# Patient Record
Sex: FEMALE | URBAN - METROPOLITAN AREA
[De-identification: names, ages, dates, MRNs, and addresses within clinical notes are randomized per-mention and may not be internally consistent; named-entity substitution may affect disease eponyms.]

---

## 2018-08-30 ENCOUNTER — OFFICE VISIT (OUTPATIENT)
Dept: URBAN - METROPOLITAN AREA CLINIC 88 | Facility: CLINIC | Age: 48
End: 2018-08-30
Payer: COMMERCIAL

## 2018-08-30 PROCEDURE — 99213 OFFICE O/P EST LOW 20 MIN: CPT | Performed by: OPHTHALMOLOGY

## 2018-08-30 ASSESSMENT — KERATOMETRY
OD: 44.75
OS: 44.75

## 2018-08-30 NOTE — IMPRESSION/PLAN
Impression: Vitreous hemorrhage, right eye: H43.11. Plan: Discussed diagnosis in detail with patient. The patient was advised to limit physical activity and limit lifting to 50lbs. Patient was instructed to report any type of visual field changes or visual changes immediately. Patient was told to sleep with 2 pillows in order to allow the blood to gravitate.

## 2018-08-30 NOTE — IMPRESSION/PLAN
Impression: Type 2 diabetes mellitus with proliferative diabetic retinopathy without macular edema, bilateral: Z32.0341. Plan: Discussed diagnosis in detail with patient. Emphasized blood sugar control. Keep future appts with PCP. If the [facility] does not clear within 3 weeks, patient was advised to call office will see patient sooner. Discussed Anti-VEGF injection if the [facility] does not clear. Risks/benefits were discussed.

## 2018-10-04 ENCOUNTER — OFFICE VISIT (OUTPATIENT)
Dept: URBAN - METROPOLITAN AREA CLINIC 88 | Facility: CLINIC | Age: 48
End: 2018-10-04
Payer: COMMERCIAL

## 2018-10-04 DIAGNOSIS — H25.813 COMBINED FORMS OF AGE-RELATED CATARACT, BILATERAL: ICD-10-CM

## 2018-10-04 PROCEDURE — 99213 OFFICE O/P EST LOW 20 MIN: CPT | Performed by: OPHTHALMOLOGY

## 2018-10-04 ASSESSMENT — INTRAOCULAR PRESSURE
OS: 16
OD: 16

## 2018-10-04 NOTE — IMPRESSION/PLAN
Impression: Combined forms of age-related cataract, bilateral: H25.813. Plan: Discussed diagnosis in detail with patient. Surgical treatment is required. Surgical risks and benefits were discussed, explained and understood by patient. Explained to the patient if she undergoes Cataract sx OS, will not improve her Central vision may improve her periphery vision.

## 2018-10-04 NOTE — IMPRESSION/PLAN
Impression: Type 2 diabetes mellitus with proliferative diabetic retinopathy without macular edema, bilateral: Q21.2712. Plan: Discussed diagnosis in detail with patient. Emphasized blood sugar control. Keep future appts with PCP. Recommend Avastin injection treatment ,risks and benefits were discussed, explained and understood by patient.

## 2018-10-09 ENCOUNTER — PROCEDURE (OUTPATIENT)
Dept: URBAN - METROPOLITAN AREA CLINIC 88 | Facility: CLINIC | Age: 48
End: 2018-10-09
Payer: COMMERCIAL

## 2018-10-09 PROCEDURE — 67028 INJECTION EYE DRUG: CPT | Performed by: OPHTHALMOLOGY

## 2018-10-09 ASSESSMENT — INTRAOCULAR PRESSURE
OD: 15
OD: 16
OS: 18

## 2018-11-06 ENCOUNTER — OFFICE VISIT (OUTPATIENT)
Dept: URBAN - METROPOLITAN AREA CLINIC 88 | Facility: CLINIC | Age: 48
End: 2018-11-06
Payer: COMMERCIAL

## 2018-11-06 DIAGNOSIS — E11.3591 TYPE 2 DIAB WITH PROLIF DIAB RTNOP WITHOUT MCLR EDEMA, R EYE: Primary | ICD-10-CM

## 2018-11-06 PROCEDURE — 67028 INJECTION EYE DRUG: CPT | Performed by: OPHTHALMOLOGY

## 2018-11-06 ASSESSMENT — INTRAOCULAR PRESSURE
OD: 30
OD: 15
OS: 16

## 2018-12-13 ENCOUNTER — OFFICE VISIT (OUTPATIENT)
Dept: URBAN - METROPOLITAN AREA CLINIC 88 | Facility: CLINIC | Age: 48
End: 2018-12-13
Payer: COMMERCIAL

## 2018-12-13 DIAGNOSIS — E11.3593 TYPE 2 DIAB WITH PROLIF DIAB RTNOP WITHOUT MACULAR EDEMA, BI: Primary | ICD-10-CM

## 2018-12-13 PROCEDURE — 99214 OFFICE O/P EST MOD 30 MIN: CPT | Performed by: OPHTHALMOLOGY

## 2018-12-13 ASSESSMENT — INTRAOCULAR PRESSURE
OS: 19
OD: 19

## 2018-12-13 NOTE — IMPRESSION/PLAN
Impression: Combined forms of age-related cataract, bilateral: H25.813. OU. Plan: Cataracts account for the patient's complaints. Discussed all risks, benefits, procedures and recovery. Patient understands changing glasses will not improve vision. Patient desires to have surgery, recommend phacoemulsification with intraocular lens. Explained to the patient by removing the Cataract on the left eye she may noticed improvement with her peripheral vision. Surgical risks and benefits were discussed, explained and to patient.  Final post operative refractive decision will be made by Operative Surgeon

## 2018-12-13 NOTE — IMPRESSION/PLAN
Impression: Type 2 diab with prolif diab rtnop without macular edema, bi: V94.9490 OU. Condition: established, worsening. Plan: Discussed diagnosis in detail with patient. Discussed ocular and systemic benefits of blood sugar control. Keep future appts. with PCP. Discussed with patient she may need Avastin tx. in the future. Patient was instructed to monitor vision for sudden changes and to call if visual changes noted.

## 2018-12-13 NOTE — IMPRESSION/PLAN
Impression: Vitreous hemorrhage, bilateral: H43.13. OU. Condition: will continue to monitor. Plan: Discussed diagnosis in detail with patient. Patient was instructed to report any type of visual field changes or visual changes immediately.

## 2019-01-03 ENCOUNTER — OFFICE VISIT (OUTPATIENT)
Dept: URBAN - METROPOLITAN AREA CLINIC 88 | Facility: CLINIC | Age: 49
End: 2019-01-03
Payer: COMMERCIAL

## 2019-01-03 DIAGNOSIS — H35.82 RETINAL ISCHEMIA: ICD-10-CM

## 2019-01-03 DIAGNOSIS — H43.13 VITREOUS HEMORRHAGE, BILATERAL: ICD-10-CM

## 2019-01-03 PROCEDURE — 99214 OFFICE O/P EST MOD 30 MIN: CPT | Performed by: OPHTHALMOLOGY

## 2019-01-03 ASSESSMENT — KERATOMETRY
OD: 44.75
OS: 44.13

## 2019-01-03 ASSESSMENT — INTRAOCULAR PRESSURE
OD: 17
OS: 16

## 2019-01-03 ASSESSMENT — VISUAL ACUITY: OD: 20/30

## 2019-01-03 NOTE — IMPRESSION/PLAN
Impression: Vitreous hemorrhage, bilateral: H43.13. OU. Condition: resolving. Symptoms: will continue to monitor. Plan: Discussed diagnosis in detail with patient. The patient was advised to limit physical activity and limit lifting to 50lbs. Patient was instructed to report any type of visual field changes or visual changes immediately. Patient was told to sleep with 2 pillows in order to allow the blood to gravitate.

## 2019-01-03 NOTE — IMPRESSION/PLAN
Impression: Combined forms of age-related cataract, bilateral: H25.813. OU. Condition: established, worsening. Vision: vision affected. Plan: Cataracts account for the patient's complaints. Discussed all risks, benefits, procedures and recovery. Patient understands changing glasses will not improve vision. Patient desires to have surgery, recommend phacoemulsification with intraocular lens. Surgical risks and benefits were discussed, explained and to patient. Explained to the patient by removing the Cataract on the left eye will help with her peripheral vision only. Patient understands this and would like to proceed with recommendation.  Final post operative refractive decision will be made by Operative Surgeon

## 2019-01-03 NOTE — IMPRESSION/PLAN
Impression: Type 2 diab with prolif diab rtnop without macular edema, bi: Y83.8319. Condition: resolved. Symptoms: will continue to monitor. Plan: Discussed diagnosis in detail with patient. Emphasized blood sugar control. Keep future appts with PCP. I would recommend Avastin injection treatment OS before proceeding with Cataract surgery OS ,risks and benefits were discussed, explained and understood by patient. May need Avastin tx in the right eye in the future.

## 2019-01-03 NOTE — IMPRESSION/PLAN
Impression: Retinal ischemia: H35.82. OS. Condition: established, stable. Plan: Advised patient of condition. Continue to observe.

## 2019-01-17 ENCOUNTER — PROCEDURE (OUTPATIENT)
Dept: URBAN - METROPOLITAN AREA CLINIC 88 | Facility: CLINIC | Age: 49
End: 2019-01-17
Payer: COMMERCIAL

## 2019-01-17 DIAGNOSIS — E11.3592 TYPE 2 DIAB WITH PROLIF DIAB RTNOP WITHOUT MCLR EDEMA, L EYE: Primary | ICD-10-CM

## 2019-01-17 PROCEDURE — 67028 INJECTION EYE DRUG: CPT | Performed by: OPHTHALMOLOGY

## 2019-01-17 ASSESSMENT — INTRAOCULAR PRESSURE
OS: 18
OD: 18

## 2019-01-25 ENCOUNTER — TESTING ONLY (OUTPATIENT)
Dept: URBAN - METROPOLITAN AREA CLINIC 88 | Facility: CLINIC | Age: 49
End: 2019-01-25
Payer: MEDICARE

## 2019-01-25 PROCEDURE — 92136 OPHTHALMIC BIOMETRY: CPT | Performed by: OPHTHALMOLOGY

## 2019-01-31 ENCOUNTER — Encounter (OUTPATIENT)
Dept: URBAN - METROPOLITAN AREA EXTERNAL CLINIC 48 | Facility: EXTERNAL CLINIC | Age: 49
End: 2019-01-31
Payer: MEDICARE

## 2019-01-31 PROCEDURE — 66984 XCAPSL CTRC RMVL W/O ECP: CPT | Performed by: OPHTHALMOLOGY

## 2019-02-01 ENCOUNTER — POST-OPERATIVE VISIT (OUTPATIENT)
Dept: URBAN - METROPOLITAN AREA CLINIC 88 | Facility: CLINIC | Age: 49
End: 2019-02-01

## 2019-02-01 PROCEDURE — 99024 POSTOP FOLLOW-UP VISIT: CPT | Performed by: OPTOMETRIST

## 2019-02-01 ASSESSMENT — INTRAOCULAR PRESSURE: OS: 17

## 2019-02-08 ENCOUNTER — POST-OPERATIVE VISIT (OUTPATIENT)
Dept: URBAN - METROPOLITAN AREA CLINIC 88 | Facility: CLINIC | Age: 49
End: 2019-02-08

## 2019-02-08 PROCEDURE — 99024 POSTOP FOLLOW-UP VISIT: CPT | Performed by: OPTOMETRIST

## 2019-02-08 ASSESSMENT — INTRAOCULAR PRESSURE: OS: 16

## 2019-02-08 ASSESSMENT — VISUAL ACUITY: OS: 20/100

## 2019-03-07 ENCOUNTER — POST-OPERATIVE VISIT (OUTPATIENT)
Dept: URBAN - METROPOLITAN AREA CLINIC 88 | Facility: CLINIC | Age: 49
End: 2019-03-07

## 2019-03-07 DIAGNOSIS — Z09 ENCNTR FOR F/U EXAM AFT TRTMT FOR COND OTH THAN MALIG NEOPLM: Primary | ICD-10-CM

## 2019-03-07 PROCEDURE — 99024 POSTOP FOLLOW-UP VISIT: CPT | Performed by: OPTOMETRIST

## 2019-03-07 ASSESSMENT — VISUAL ACUITY
OS: 20/200+1
OD: 20/25

## 2019-03-07 ASSESSMENT — INTRAOCULAR PRESSURE
OD: 16
OS: 16

## 2019-06-18 ENCOUNTER — OFFICE VISIT (OUTPATIENT)
Dept: URBAN - METROPOLITAN AREA CLINIC 88 | Facility: CLINIC | Age: 49
End: 2019-06-18
Payer: MEDICARE

## 2019-06-18 DIAGNOSIS — Z96.1 PRESENCE OF INTRAOCULAR LENS: ICD-10-CM

## 2019-06-18 DIAGNOSIS — H26.492 OTHER SECONDARY CATARACT, LEFT EYE: ICD-10-CM

## 2019-06-18 DIAGNOSIS — H43.11 VITREOUS HEMORRHAGE, RIGHT EYE: ICD-10-CM

## 2019-06-18 PROCEDURE — 99214 OFFICE O/P EST MOD 30 MIN: CPT | Performed by: OPHTHALMOLOGY

## 2019-06-18 ASSESSMENT — INTRAOCULAR PRESSURE
OS: 18
OD: 18

## 2019-06-18 NOTE — IMPRESSION/PLAN
Impression: Presence of intraocular lens: Z96.1. OS. Plan: May need YAG Laser in the future.  Continue to observe

## 2019-06-18 NOTE — IMPRESSION/PLAN
Impression: Vitreous hemorrhage, right eye: H43.11. OD. Plan: Discussed diagnosis in detail with patient. The patient was advised to limit physical activity and limit lifting to 50lbs. Patient was instructed to report any type of visual field changes or visual changes immediately. Patient was told to sleep with 2 pillows in order to allow the blood to gravitate.

## 2019-06-18 NOTE — IMPRESSION/PLAN
Impression: Type 2 diab with prolif diab rtnop without macular edema, bi: [id number]. Condition: unstable. Symptoms: will continue to monitor. Plan: Discussed diagnosis in detail with patient. Emphasized blood sugar control. Keep future appts with PCP. I would recommend Avastin injection treatment ,risks and benefits were discussed, explained and understood by patient.

## 2019-06-18 NOTE — IMPRESSION/PLAN
Impression: Other secondary cataract, left eye: H26.492. OS. Plan: Discussed diagnosis in detail with patient. May need Yag Laser treatment in the future.  Discussed risks/benefits of laser [place]. continue to observe

## 2019-07-11 ENCOUNTER — PROCEDURE (OUTPATIENT)
Dept: URBAN - METROPOLITAN AREA CLINIC 88 | Facility: CLINIC | Age: 49
End: 2019-07-11
Payer: MEDICARE

## 2019-07-11 PROCEDURE — 67028 INJECTION EYE DRUG: CPT | Performed by: OPHTHALMOLOGY

## 2019-07-11 ASSESSMENT — INTRAOCULAR PRESSURE
OD: 15
OS: 15
OD: 25